# Patient Record
Sex: FEMALE | Race: WHITE | URBAN - METROPOLITAN AREA
[De-identification: names, ages, dates, MRNs, and addresses within clinical notes are randomized per-mention and may not be internally consistent; named-entity substitution may affect disease eponyms.]

---

## 2022-10-17 ENCOUNTER — TELEPHONE (OUTPATIENT)
Dept: FAMILY MEDICINE CLINIC | Facility: CLINIC | Age: 37
End: 2022-10-17

## 2022-10-17 ENCOUNTER — OFFICE VISIT (OUTPATIENT)
Dept: FAMILY MEDICINE CLINIC | Facility: CLINIC | Age: 37
End: 2022-10-17
Payer: COMMERCIAL

## 2022-10-17 VITALS
RESPIRATION RATE: 16 BRPM | BODY MASS INDEX: 23.98 KG/M2 | SYSTOLIC BLOOD PRESSURE: 106 MMHG | WEIGHT: 151 LBS | HEIGHT: 66.5 IN | DIASTOLIC BLOOD PRESSURE: 72 MMHG | HEART RATE: 68 BPM | OXYGEN SATURATION: 98 %

## 2022-10-17 DIAGNOSIS — Z11.1 SCREENING FOR TUBERCULOSIS: ICD-10-CM

## 2022-10-17 DIAGNOSIS — Z00.00 LABORATORY EXAMINATION ORDERED AS PART OF A ROUTINE GENERAL MEDICAL EXAMINATION: ICD-10-CM

## 2022-10-17 DIAGNOSIS — Z00.00 WELLNESS EXAMINATION: Primary | ICD-10-CM

## 2022-10-17 PROBLEM — N80.9 ENDOMETRIOSIS: Status: ACTIVE | Noted: 2022-10-17

## 2022-10-19 ENCOUNTER — NURSE ONLY (OUTPATIENT)
Dept: FAMILY MEDICINE CLINIC | Facility: CLINIC | Age: 37
End: 2022-10-19
Payer: COMMERCIAL

## 2022-10-19 LAB — INDURATION (): 0 MM (ref 0–11)

## 2022-10-19 NOTE — PROGRESS NOTES
Wellness form was completed during nurse visit / was faxed # [phone] to [facility] 202 per Pt request. Success confirmation was received. Copy sent to scanning.

## 2022-10-26 ENCOUNTER — MED REC SCAN ONLY (OUTPATIENT)
Dept: FAMILY MEDICINE CLINIC | Facility: CLINIC | Age: 37
End: 2022-10-26

## 2022-11-04 ENCOUNTER — LAB ENCOUNTER (OUTPATIENT)
Dept: LAB | Age: 37
End: 2022-11-04
Attending: FAMILY MEDICINE
Payer: COMMERCIAL

## 2022-11-04 DIAGNOSIS — Z00.00 LABORATORY EXAMINATION ORDERED AS PART OF A ROUTINE GENERAL MEDICAL EXAMINATION: ICD-10-CM

## 2022-11-04 LAB
ALBUMIN SERPL-MCNC: 4.4 G/DL (ref 3.4–5)
ALBUMIN/GLOB SERPL: 1.1 {RATIO} (ref 1–2)
ALP LIVER SERPL-CCNC: 63 U/L
ALT SERPL-CCNC: 21 U/L
ANION GAP SERPL CALC-SCNC: 8 MMOL/L (ref 0–18)
AST SERPL-CCNC: 13 U/L (ref 15–37)
BASOPHILS # BLD AUTO: 0.03 X10(3) UL (ref 0–0.2)
BASOPHILS NFR BLD AUTO: 0.6 %
BILIRUB SERPL-MCNC: 0.6 MG/DL (ref 0.1–2)
BUN BLD-MCNC: 11 MG/DL (ref 7–18)
CALCIUM BLD-MCNC: 9.2 MG/DL (ref 8.5–10.1)
CHLORIDE SERPL-SCNC: 108 MMOL/L (ref 98–112)
CHOLEST SERPL-MCNC: 141 MG/DL (ref ?–200)
CO2 SERPL-SCNC: 23 MMOL/L (ref 21–32)
CREAT BLD-MCNC: 0.79 MG/DL
EOSINOPHIL # BLD AUTO: 0.09 X10(3) UL (ref 0–0.7)
EOSINOPHIL NFR BLD AUTO: 1.9 %
ERYTHROCYTE [DISTWIDTH] IN BLOOD BY AUTOMATED COUNT: 12.6 %
FASTING PATIENT LIPID ANSWER: YES
FASTING STATUS PATIENT QL REPORTED: YES
GFR SERPLBLD BASED ON 1.73 SQ M-ARVRAT: 100 ML/MIN/1.73M2 (ref 60–?)
GLOBULIN PLAS-MCNC: 3.9 G/DL (ref 2.8–4.4)
GLUCOSE BLD-MCNC: 100 MG/DL (ref 70–99)
HCT VFR BLD AUTO: 39.9 %
HDLC SERPL-MCNC: 66 MG/DL (ref 40–59)
HGB BLD-MCNC: 13.6 G/DL
IMM GRANULOCYTES # BLD AUTO: 0.01 X10(3) UL (ref 0–1)
IMM GRANULOCYTES NFR BLD: 0.2 %
LDLC SERPL CALC-MCNC: 62 MG/DL (ref ?–100)
LYMPHOCYTES # BLD AUTO: 1.27 X10(3) UL (ref 1–4)
LYMPHOCYTES NFR BLD AUTO: 26.4 %
MCH RBC QN AUTO: 32.5 PG (ref 26–34)
MCHC RBC AUTO-ENTMCNC: 34.1 G/DL (ref 31–37)
MCV RBC AUTO: 95.5 FL
MONOCYTES # BLD AUTO: 0.27 X10(3) UL (ref 0.1–1)
MONOCYTES NFR BLD AUTO: 5.6 %
NEUTROPHILS # BLD AUTO: 3.14 X10 (3) UL (ref 1.5–7.7)
NEUTROPHILS # BLD AUTO: 3.14 X10(3) UL (ref 1.5–7.7)
NEUTROPHILS NFR BLD AUTO: 65.3 %
NONHDLC SERPL-MCNC: 75 MG/DL (ref ?–130)
OSMOLALITY SERPL CALC.SUM OF ELEC: 287 MOSM/KG (ref 275–295)
PLATELET # BLD AUTO: 251 10(3)UL (ref 150–450)
POTASSIUM SERPL-SCNC: 4.4 MMOL/L (ref 3.5–5.1)
PROT SERPL-MCNC: 8.3 G/DL (ref 6.4–8.2)
RBC # BLD AUTO: 4.18 X10(6)UL
SODIUM SERPL-SCNC: 139 MMOL/L (ref 136–145)
T4 FREE SERPL-MCNC: 0.8 NG/DL (ref 0.8–1.7)
TRIGL SERPL-MCNC: 63 MG/DL (ref 30–149)
TSI SER-ACNC: 4.13 MIU/ML (ref 0.36–3.74)
VLDLC SERPL CALC-MCNC: 9 MG/DL (ref 0–30)
WBC # BLD AUTO: 4.8 X10(3) UL (ref 4–11)

## 2022-11-04 PROCEDURE — 80061 LIPID PANEL: CPT | Performed by: FAMILY MEDICINE

## 2022-11-04 PROCEDURE — 84439 ASSAY OF FREE THYROXINE: CPT | Performed by: FAMILY MEDICINE

## 2022-11-04 PROCEDURE — 80050 GENERAL HEALTH PANEL: CPT | Performed by: FAMILY MEDICINE

## 2022-11-07 ENCOUNTER — TELEPHONE (OUTPATIENT)
Dept: FAMILY MEDICINE CLINIC | Facility: CLINIC | Age: 37
End: 2022-11-07

## 2022-11-07 DIAGNOSIS — R79.89 ELEVATED TSH: Primary | ICD-10-CM

## 2022-11-07 NOTE — TELEPHONE ENCOUNTER
----- Message from Santiago Guajardo MD sent at 11/7/2022 12:58 PM CST -----  Annual labs reviewed. FBG borderline, improve diet/exercise. Thyroid labs abnormal showing subclinical hypothyroidism pattern. No need for treatment right now but will repeat TSH in 6 wks to monitor.

## 2022-12-06 ENCOUNTER — OFFICE VISIT (OUTPATIENT)
Dept: FAMILY MEDICINE CLINIC | Facility: CLINIC | Age: 37
End: 2022-12-06
Payer: COMMERCIAL

## 2022-12-06 VITALS
DIASTOLIC BLOOD PRESSURE: 78 MMHG | HEIGHT: 67 IN | HEART RATE: 80 BPM | TEMPERATURE: 98 F | SYSTOLIC BLOOD PRESSURE: 112 MMHG | RESPIRATION RATE: 18 BRPM | BODY MASS INDEX: 23.54 KG/M2 | WEIGHT: 150 LBS | OXYGEN SATURATION: 98 %

## 2022-12-06 DIAGNOSIS — R68.89 FLU-LIKE SYMPTOMS: Primary | ICD-10-CM

## 2022-12-06 DIAGNOSIS — J02.9 SORE THROAT: ICD-10-CM

## 2022-12-06 LAB
CONTROL LINE PRESENT WITH A CLEAR BACKGROUND (YES/NO): YES YES/NO
KIT LOT #: NORMAL NUMERIC
STREP GRP A CUL-SCR: NEGATIVE

## 2022-12-06 PROCEDURE — 3008F BODY MASS INDEX DOCD: CPT | Performed by: NURSE PRACTITIONER

## 2022-12-06 PROCEDURE — 87880 STREP A ASSAY W/OPTIC: CPT | Performed by: NURSE PRACTITIONER

## 2022-12-06 PROCEDURE — 87637 SARSCOV2&INF A&B&RSV AMP PRB: CPT | Performed by: NURSE PRACTITIONER

## 2022-12-06 PROCEDURE — 3078F DIAST BP <80 MM HG: CPT | Performed by: NURSE PRACTITIONER

## 2022-12-06 PROCEDURE — 3074F SYST BP LT 130 MM HG: CPT | Performed by: NURSE PRACTITIONER

## 2022-12-06 PROCEDURE — 99213 OFFICE O/P EST LOW 20 MIN: CPT | Performed by: NURSE PRACTITIONER

## 2022-12-07 LAB
FLUAV + FLUBV RNA SPEC NAA+PROBE: NEGATIVE
FLUAV + FLUBV RNA SPEC NAA+PROBE: NEGATIVE
RSV RNA SPEC NAA+PROBE: NEGATIVE
SARS-COV-2 RNA RESP QL NAA+PROBE: NOT DETECTED

## 2024-01-03 ENCOUNTER — LAB ENCOUNTER (OUTPATIENT)
Dept: LAB | Age: 39
End: 2024-01-03
Attending: FAMILY MEDICINE
Payer: COMMERCIAL

## 2024-01-03 ENCOUNTER — OFFICE VISIT (OUTPATIENT)
Dept: FAMILY MEDICINE CLINIC | Facility: CLINIC | Age: 39
End: 2024-01-03
Payer: COMMERCIAL

## 2024-01-03 VITALS
RESPIRATION RATE: 16 BRPM | SYSTOLIC BLOOD PRESSURE: 106 MMHG | HEIGHT: 67 IN | WEIGHT: 152 LBS | HEART RATE: 71 BPM | OXYGEN SATURATION: 98 % | BODY MASS INDEX: 23.86 KG/M2 | DIASTOLIC BLOOD PRESSURE: 66 MMHG

## 2024-01-03 DIAGNOSIS — Z00.00 WELLNESS EXAMINATION: Primary | ICD-10-CM

## 2024-01-03 DIAGNOSIS — Z23 NEED FOR VACCINATION: ICD-10-CM

## 2024-01-03 DIAGNOSIS — Z00.00 LABORATORY EXAMINATION ORDERED AS PART OF A ROUTINE GENERAL MEDICAL EXAMINATION: ICD-10-CM

## 2024-01-03 DIAGNOSIS — N80.9 ENDOMETRIOSIS: ICD-10-CM

## 2024-01-03 DIAGNOSIS — M25.531 RIGHT WRIST PAIN: ICD-10-CM

## 2024-01-03 LAB
ALBUMIN SERPL-MCNC: 4.5 G/DL (ref 3.4–5)
ALBUMIN/GLOB SERPL: 1.3 {RATIO} (ref 1–2)
ALP LIVER SERPL-CCNC: 62 U/L
ALT SERPL-CCNC: 25 U/L
ANION GAP SERPL CALC-SCNC: 4 MMOL/L (ref 0–18)
AST SERPL-CCNC: 20 U/L (ref 15–37)
BASOPHILS # BLD AUTO: 0.02 X10(3) UL (ref 0–0.2)
BASOPHILS NFR BLD AUTO: 0.5 %
BILIRUB SERPL-MCNC: 0.8 MG/DL (ref 0.1–2)
BUN BLD-MCNC: 10 MG/DL (ref 9–23)
CALCIUM BLD-MCNC: 9.1 MG/DL (ref 8.5–10.1)
CHLORIDE SERPL-SCNC: 104 MMOL/L (ref 98–112)
CHOLEST SERPL-MCNC: 166 MG/DL (ref ?–200)
CO2 SERPL-SCNC: 27 MMOL/L (ref 21–32)
CREAT BLD-MCNC: 0.79 MG/DL
EGFRCR SERPLBLD CKD-EPI 2021: 99 ML/MIN/1.73M2 (ref 60–?)
EOSINOPHIL # BLD AUTO: 0.06 X10(3) UL (ref 0–0.7)
EOSINOPHIL NFR BLD AUTO: 1.5 %
ERYTHROCYTE [DISTWIDTH] IN BLOOD BY AUTOMATED COUNT: 12.5 %
FASTING PATIENT LIPID ANSWER: YES
FASTING STATUS PATIENT QL REPORTED: YES
GLOBULIN PLAS-MCNC: 3.5 G/DL (ref 2.8–4.4)
GLUCOSE BLD-MCNC: 102 MG/DL (ref 70–99)
HCT VFR BLD AUTO: 39 %
HDLC SERPL-MCNC: 66 MG/DL (ref 40–59)
HGB BLD-MCNC: 13.1 G/DL
IMM GRANULOCYTES # BLD AUTO: 0.02 X10(3) UL (ref 0–1)
IMM GRANULOCYTES NFR BLD: 0.5 %
LDLC SERPL CALC-MCNC: 82 MG/DL (ref ?–100)
LYMPHOCYTES # BLD AUTO: 1.08 X10(3) UL (ref 1–4)
LYMPHOCYTES NFR BLD AUTO: 26.4 %
MCH RBC QN AUTO: 31.4 PG (ref 26–34)
MCHC RBC AUTO-ENTMCNC: 33.6 G/DL (ref 31–37)
MCV RBC AUTO: 93.5 FL
MONOCYTES # BLD AUTO: 0.32 X10(3) UL (ref 0.1–1)
MONOCYTES NFR BLD AUTO: 7.8 %
NEUTROPHILS # BLD AUTO: 2.59 X10 (3) UL (ref 1.5–7.7)
NEUTROPHILS # BLD AUTO: 2.59 X10(3) UL (ref 1.5–7.7)
NEUTROPHILS NFR BLD AUTO: 63.3 %
NONHDLC SERPL-MCNC: 100 MG/DL (ref ?–130)
OSMOLALITY SERPL CALC.SUM OF ELEC: 279 MOSM/KG (ref 275–295)
PLATELET # BLD AUTO: 260 10(3)UL (ref 150–450)
POTASSIUM SERPL-SCNC: 4 MMOL/L (ref 3.5–5.1)
PROT SERPL-MCNC: 8 G/DL (ref 6.4–8.2)
RBC # BLD AUTO: 4.17 X10(6)UL
SODIUM SERPL-SCNC: 135 MMOL/L (ref 136–145)
TRIGL SERPL-MCNC: 98 MG/DL (ref 30–149)
TSI SER-ACNC: 2.97 MIU/ML (ref 0.36–3.74)
VLDLC SERPL CALC-MCNC: 15 MG/DL (ref 0–30)
WBC # BLD AUTO: 4.1 X10(3) UL (ref 4–11)

## 2024-01-03 PROCEDURE — 80053 COMPREHEN METABOLIC PANEL: CPT

## 2024-01-03 PROCEDURE — 3078F DIAST BP <80 MM HG: CPT | Performed by: FAMILY MEDICINE

## 2024-01-03 PROCEDURE — 90686 IIV4 VACC NO PRSV 0.5 ML IM: CPT | Performed by: FAMILY MEDICINE

## 2024-01-03 PROCEDURE — 80061 LIPID PANEL: CPT

## 2024-01-03 PROCEDURE — 3008F BODY MASS INDEX DOCD: CPT | Performed by: FAMILY MEDICINE

## 2024-01-03 PROCEDURE — 99395 PREV VISIT EST AGE 18-39: CPT | Performed by: FAMILY MEDICINE

## 2024-01-03 PROCEDURE — 3074F SYST BP LT 130 MM HG: CPT | Performed by: FAMILY MEDICINE

## 2024-01-03 PROCEDURE — 84443 ASSAY THYROID STIM HORMONE: CPT

## 2024-01-03 PROCEDURE — 85025 COMPLETE CBC W/AUTO DIFF WBC: CPT

## 2024-01-03 PROCEDURE — 90471 IMMUNIZATION ADMIN: CPT | Performed by: FAMILY MEDICINE

## 2024-01-03 NOTE — PROGRESS NOTES
HPI:     Magdalena Hernandez is a 38 year old female who presents for an Annual Health Visit.      R wrist pain - intermittent, comes and goes. Only occurs when she is pushing off from her R hand/wrist. No pain otherwise. No swelling, warmth/redness, numbness. No known injury. No sx currently.     Allergies:     Allergies   Allergen Reactions    Amoxicillin OTHER (SEE COMMENTS)     Do not remember reaction   - per Pt      Augmentin [Amoxicillin-Pot Clavulanate] UNKNOWN    Penicillins UNKNOWN       CURRENT MEDICATIONS:   No current outpatient medications on file.        MEDICAL INFORMATION:   Past Medical History:   Diagnosis Date    Mild preeclamp-del w/ postpartum complication 2021      No past surgical history on file.   Family History   Problem Relation Age of Onset    Bipolar Disorder Mother     Depression Mother     Anxiety Mother     Fibromyalgia Mother       SOCIAL HISTORY:   Social History     Socioeconomic History    Marital status:    Tobacco Use    Smoking status: Never    Smokeless tobacco: Never   Vaping Use    Vaping Use: Never used   Substance and Sexual Activity    Alcohol use: Yes     Alcohol/week: 2.0 standard drinks of alcohol     Types: 2 Glasses of wine per week     Comment: 3-4 times per week /     Drug use: Never     Social History     Social History Narrative    Not on file        REVIEW OF SYSTEMS:     Constitutional: negative  Eyes: negative  ENT: negative  Respiratory: negative  Cardiovascular: negative  Gastrointestinal: negative  Integument/Breast: negative  Genitourinary: negative  Heme/Lymph: negative  Musculoskeletal:  see HPI  Neurological: negative  Psych: negative  Endocrine: negative  Allergic/Immune: negative        EXAM:   /66   Pulse 71   Resp 16   Ht 5' 7\" (1.702 m)   Wt 152 lb (68.9 kg)   LMP 12/25/2023 (Exact Date)   SpO2 98%   BMI 23.81 kg/m²    Wt Readings from Last 6 Encounters:   01/03/24 152 lb (68.9 kg)   12/06/22 150 lb (68 kg)   10/17/22  151 lb (68.5 kg)     Body mass index is 23.81 kg/m².    General: alert, appears stated age, and cooperative  Head: Normocephalic, without obvious abnormality, atraumatic  Eyes: negative  Ears: normal TM's and external ear canals both ears  Nose: Nares normal. Septum midline. Mucosa normal. No drainage or sinus tenderness.  Throat: lips, mucosa, and tongue normal; teeth and gums normal  Neck: no adenopathy, supple, symmetrical, trachea midline, and thyroid not enlarged, symmetric, no tenderness/mass/nodules  Heart: S1, S2 normal, no murmur, click, rub or gallop, regular rate and rhythm  Lungs: clear to auscultation bilaterally  Breast:  deferred  Abdomen: soft, non-tender; bowel sounds normal; no masses,  no organomegaly  Pelvic: deferred  Back: negative  Extremities: extremities normal, atraumatic, no cyanosis or edema. R hand/wrist exam wnl, ROM wnl.  Pulses: 2+ and symmetric  Skin: Skin color, texture, turgor normal. No rashes or lesions  Lymph Nodes:  No LAD  Neurologic: Grossly normal      ASSESSMENT AND PLAN:   Magdalena was seen today for well adult and complete form.    Diagnoses and all orders for this visit:    Wellness examination  -Immunizations: Flu today   -Metabolic: BMI 23. BP wnl. Due for annual labs   -Cancer screening: pap upcoming.   -Communicable disease: low risk   -Mental health: no concerns   -Other preventative: follow with dentistry and optometry.   -Lifestyle: Follow a well balanced healthy diet with emphasis on fruits, vegetables, whole grains, lean meats. Limit processed and junk foods. Aim for at least 150 minutes of moderate intensity exercise weekly. Make sure you are staying adequately hydrated. Aim to get 7-9 hours of sleep nightly.     Laboratory examination ordered as part of a routine general medical examination  -     CBC With Differential With Platelet; Future  -     Comp Metabolic Panel (14); Future  -     Lipid Panel; Future  -     TSH W Reflex To Free T4;  Future    Endometriosis  Needs new referral for GYN.   -     OBG Referral - In Network    Right wrist pain  Possible tendinitis? Counseled on conservative mgmt including RICE therapy, NSAIDs/tylenol. F/u if no improving will obtain imaging.     Patient Instructions     Health Screening Guidelines, Women Ages 18 to 39   Screening tests and health counseling are a key part of managing your health. A screening test is done to find disorders or diseases in people who don't have any symptoms. Screening tests are not used to diagnose. They are used to find out if more testing is needed. The goal may be to find a disease early so it can be treated with more success. Or the goal may be to find a disease early so you can make lifestyle changes. You may need regular checkups to help you reduce your risk of disease.   Below are guidelines for women ages 18 to 39. Talk with your healthcare provider. Make sure you’re up-to-date on what you need.   We understand gender is a spectrum. We may use gendered terms to talk about anatomy and health risk. Please use this information in a way that works best for you and your provider as you talk about your care.   Screening  Who needs it  How often    Alcohol misuse All women in this age group  At routine exams   Blood pressure All women in this age group  Once a year if your blood pressure is normal. Normal blood pressure is less than 120/80 mm Hg. If your blood pressure is higher than this, follow the advice of your healthcare provider.    Breast cancer All women in this age group should talk with their healthcare provider about a clinical breast exam (CBE).1  Clinical breast exam every 3 years    Cervical cancer Women ages 21 and older  Women ages 21 to 29 should have a Pap test every 3 years. Women ages 30 to 65 should have a Pap test and an HPV test every 5 years.    Chlamydia Women who are sexually active. This includes those who are pregnant or who are:   Age 24 or younger  Age 25  or older at higher risk for infection     At routine yearly exams   If pregnant, during early prenatal care visit. Repeat in 3rd trimester for women at higher risk.    Depression All women in this age group  At routine exams   Diabetes mellitus, type 2  Women with no symptoms who are overweight or obese and have 1 or more other risk factors for diabetes  At least every 3 years. Testing in pregnancy after the 24th week.     Gonorrhea Women who are sexually active. This includes those who are pregnant or who are:   Age 24 or younger  Age 25 or older at higher risk for infection     At routine yearly exams   Test in pregnancy if age 25 or younger or if living in an area where gonorrhea is common    Hepatitis C Anyone at higher risk  At routine exams   HIV All women At routine exams and in all pregnant people    Obesity All women in this age group  At routine exams   Syphilis Women who are at higher risk for infection. Talk with your healthcare provider.  At routine exams   Tuberculosis Women who are at higher risk for infection. Talk with your healthcare provider.  Ask your healthcare provider    Vision All women in this age group  At least 1 full exam in your 20s, and 2 in your 30s    Health counseling  Who needs it  How often    BRCA gene mutation testing for breast and ovarian cancer risk  Women at higher risk for a gene mutation  When your risk is known    Breast cancer and chemoprevention  Women at high risk for breast cancer  When your risk is known    Diet and exercise Women who are overweight or obese  When diagnosed, and then at routine exams    Domestic violence All women in this age group  Every visit   Sexually transmitted infection (STI) prevention  Women who are sexually active  At routine exams   Skin cancer Women with pale skin  At routine exams   Use of tobacco and the health effects it can cause  All women in this age group  Every visit   The ACS advises all women ages 20 to 39 to have a clinical  breast exam (CBE) every 3 years. Breast self-exams are a choice for women age 20 and older. But the U.S. Preventive Services Task Force (USPSTF) does not advise CBE.   The USPSTF advises that people ages 15 to 65 years to be screened for HIV. They advise this test for younger or older people at higher risk. The CDC advises that all people ages 13 to 64 get tested for HIV at least 1 time.   [employer] last reviewed this educational content on 6/1/2021 © 2000-2023 [employer]. All rights reserved. This information is not intended as a substitute for professional medical care. Always follow your healthcare professional's instructions.          The patient indicates understanding of these issues and agrees to the plan.    Problem List:  Patient Active Problem List   Diagnosis    Endometriosis       Harris Reed MD  1/3/2024  8:13 AM

## 2024-01-03 NOTE — PATIENT INSTRUCTIONS
Health Screening Guidelines, Women Ages 18 to 39   Screening tests and health counseling are a key part of managing your health. A screening test is done to find disorders or diseases in people who don't have any symptoms. Screening tests are not used to diagnose. They are used to find out if more testing is needed. The goal may be to find a disease early so it can be treated with more success. Or the goal may be to find a disease early so you can make lifestyle changes. You may need regular checkups to help you reduce your risk of disease.   Below are guidelines for women ages 18 to 39. Talk with your healthcare provider. Make sure you’re up-to-date on what you need.   We understand gender is a spectrum. We may use gendered terms to talk about anatomy and health risk. Please use this information in a way that works best for you and your provider as you talk about your care.   Screening  Who needs it  How often    Alcohol misuse All women in this age group  At routine exams   Blood pressure All women in this age group  Once a year if your blood pressure is normal. Normal blood pressure is less than 120/80 mm Hg. If your blood pressure is higher than this, follow the advice of your healthcare provider.    Breast cancer All women in this age group should talk with their healthcare provider about a clinical breast exam (CBE).1  Clinical breast exam every 3 years    Cervical cancer Women ages 21 and older  Women ages 21 to 29 should have a Pap test every 3 years. Women ages 30 to 65 should have a Pap test and an HPV test every 5 years.    Chlamydia Women who are sexually active. This includes those who are pregnant or who are:   Age 24 or younger  Age 25 or older at higher risk for infection     At routine yearly exams   If pregnant, during early prenatal care visit. Repeat in 3rd trimester for women at higher risk.    Depression All women in this age group  At routine exams   Diabetes mellitus, type 2  Women with no  symptoms who are overweight or obese and have 1 or more other risk factors for diabetes  At least every 3 years. Testing in pregnancy after the 24th week.     Gonorrhea Women who are sexually active. This includes those who are pregnant or who are:   Age 24 or younger  Age 25 or older at higher risk for infection     At routine yearly exams   Test in pregnancy if age 25 or younger or if living in an area where gonorrhea is common    Hepatitis C Anyone at higher risk  At routine exams   HIV All women At routine exams and in all pregnant people    Obesity All women in this age group  At routine exams   Syphilis Women who are at higher risk for infection. Talk with your healthcare provider.  At routine exams   Tuberculosis Women who are at higher risk for infection. Talk with your healthcare provider.  Ask your healthcare provider    Vision All women in this age group  At least 1 full exam in your 20s, and 2 in your 30s    Health counseling  Who needs it  How often    BRCA gene mutation testing for breast and ovarian cancer risk  Women at higher risk for a gene mutation  When your risk is known    Breast cancer and chemoprevention  Women at high risk for breast cancer  When your risk is known    Diet and exercise Women who are overweight or obese  When diagnosed, and then at routine exams    Domestic violence All women in this age group  Every visit   Sexually transmitted infection (STI) prevention  Women who are sexually active  At routine exams   Skin cancer Women with pale skin  At routine exams   Use of tobacco and the health effects it can cause  All women in this age group  Every visit   The ACS advises all women ages 20 to 39 to have a clinical breast exam (CBE) every 3 years. Breast self-exams are a choice for women age 20 and older. But the U.S. Preventive Services Task Force (USPSTF) does not advise CBE.   The USPSTF advises that people ages 15 to 65 years to be screened for HIV. They advise this test for  younger or older people at higher risk. The CDC advises that all people ages 13 to 64 get tested for HIV at least 1 time.   [employer] last reviewed this educational content on 6/1/2021  © 4362-3978 [employer]. All rights reserved. This information is not intended as a substitute for professional medical care. Always follow your healthcare professional's instructions.

## 2024-01-05 ENCOUNTER — TELEPHONE (OUTPATIENT)
Dept: FAMILY MEDICINE CLINIC | Facility: CLINIC | Age: 39
End: 2024-01-05

## 2024-01-05 NOTE — TELEPHONE ENCOUNTER
Wellness form completed and faxed # [phone] to [facility].Success confirmation was received. Copy of form sent to scan.

## 2025-01-03 ENCOUNTER — OFFICE VISIT (OUTPATIENT)
Dept: FAMILY MEDICINE CLINIC | Facility: CLINIC | Age: 40
End: 2025-01-03
Payer: COMMERCIAL

## 2025-01-03 ENCOUNTER — LAB ENCOUNTER (OUTPATIENT)
Dept: LAB | Age: 40
End: 2025-01-03
Attending: FAMILY MEDICINE
Payer: COMMERCIAL

## 2025-01-03 VITALS
OXYGEN SATURATION: 98 % | RESPIRATION RATE: 16 BRPM | HEIGHT: 67 IN | HEART RATE: 75 BPM | DIASTOLIC BLOOD PRESSURE: 74 MMHG | BODY MASS INDEX: 24.01 KG/M2 | WEIGHT: 153 LBS | SYSTOLIC BLOOD PRESSURE: 116 MMHG

## 2025-01-03 DIAGNOSIS — Z00.00 WELLNESS EXAMINATION: Primary | ICD-10-CM

## 2025-01-03 DIAGNOSIS — Z00.00 LABORATORY EXAMINATION ORDERED AS PART OF A ROUTINE GENERAL MEDICAL EXAMINATION: ICD-10-CM

## 2025-01-03 LAB
ALBUMIN SERPL-MCNC: 5 G/DL (ref 3.2–4.8)
ALBUMIN/GLOB SERPL: 1.4 {RATIO} (ref 1–2)
ALP LIVER SERPL-CCNC: 62 U/L
ALT SERPL-CCNC: 26 U/L
ANION GAP SERPL CALC-SCNC: 11 MMOL/L (ref 0–18)
AST SERPL-CCNC: 23 U/L (ref ?–34)
BASOPHILS # BLD AUTO: 0.02 X10(3) UL (ref 0–0.2)
BASOPHILS NFR BLD AUTO: 0.4 %
BILIRUB SERPL-MCNC: 0.4 MG/DL (ref 0.3–1.2)
BUN BLD-MCNC: 12 MG/DL (ref 9–23)
CALCIUM BLD-MCNC: 9.8 MG/DL (ref 8.7–10.4)
CHLORIDE SERPL-SCNC: 103 MMOL/L (ref 98–112)
CHOLEST SERPL-MCNC: 183 MG/DL (ref ?–200)
CO2 SERPL-SCNC: 25 MMOL/L (ref 21–32)
CREAT BLD-MCNC: 0.76 MG/DL
EGFRCR SERPLBLD CKD-EPI 2021: 103 ML/MIN/1.73M2 (ref 60–?)
EOSINOPHIL # BLD AUTO: 0.09 X10(3) UL (ref 0–0.7)
EOSINOPHIL NFR BLD AUTO: 1.8 %
ERYTHROCYTE [DISTWIDTH] IN BLOOD BY AUTOMATED COUNT: 12.8 %
FASTING PATIENT LIPID ANSWER: YES
FASTING STATUS PATIENT QL REPORTED: YES
GLOBULIN PLAS-MCNC: 3.6 G/DL (ref 2–3.5)
GLUCOSE BLD-MCNC: 89 MG/DL (ref 70–99)
HCT VFR BLD AUTO: 40.1 %
HDLC SERPL-MCNC: 64 MG/DL (ref 40–59)
HGB BLD-MCNC: 13.4 G/DL
IMM GRANULOCYTES # BLD AUTO: 0.03 X10(3) UL (ref 0–1)
IMM GRANULOCYTES NFR BLD: 0.6 %
LDLC SERPL CALC-MCNC: 105 MG/DL (ref ?–100)
LYMPHOCYTES # BLD AUTO: 1.39 X10(3) UL (ref 1–4)
LYMPHOCYTES NFR BLD AUTO: 27.9 %
MCH RBC QN AUTO: 31.5 PG (ref 26–34)
MCHC RBC AUTO-ENTMCNC: 33.4 G/DL (ref 31–37)
MCV RBC AUTO: 94.4 FL
MONOCYTES # BLD AUTO: 0.37 X10(3) UL (ref 0.1–1)
MONOCYTES NFR BLD AUTO: 7.4 %
NEUTROPHILS # BLD AUTO: 3.08 X10 (3) UL (ref 1.5–7.7)
NEUTROPHILS # BLD AUTO: 3.08 X10(3) UL (ref 1.5–7.7)
NEUTROPHILS NFR BLD AUTO: 61.9 %
NONHDLC SERPL-MCNC: 119 MG/DL (ref ?–130)
OSMOLALITY SERPL CALC.SUM OF ELEC: 287 MOSM/KG (ref 275–295)
PLATELET # BLD AUTO: 277 10(3)UL (ref 150–450)
POTASSIUM SERPL-SCNC: 5 MMOL/L (ref 3.5–5.1)
PROT SERPL-MCNC: 8.6 G/DL (ref 5.7–8.2)
RBC # BLD AUTO: 4.25 X10(6)UL
SODIUM SERPL-SCNC: 139 MMOL/L (ref 136–145)
TRIGL SERPL-MCNC: 78 MG/DL (ref 30–149)
TSI SER-ACNC: 4.02 UIU/ML (ref 0.55–4.78)
VLDLC SERPL CALC-MCNC: 13 MG/DL (ref 0–30)
WBC # BLD AUTO: 5 X10(3) UL (ref 4–11)

## 2025-01-03 PROCEDURE — 80050 GENERAL HEALTH PANEL: CPT | Performed by: FAMILY MEDICINE

## 2025-01-03 PROCEDURE — 80061 LIPID PANEL: CPT | Performed by: FAMILY MEDICINE

## 2025-01-03 RX ORDER — GINKGO BILOBA LEAF EXTRACT 60 MG
CAPSULE ORAL AS DIRECTED
COMMUNITY

## 2025-01-03 NOTE — PATIENT INSTRUCTIONS
Health Screening Guidelines, Women Ages 18 to 39   Screening tests and health counseling are a key part of managing your health. A screening test is done to find disorders or diseases in people who don't have any symptoms. Screening tests are not used to diagnose. They are used to find out if more testing is needed. The goal may be to find a disease early so it can be treated with more success. Or the goal may be to find a disease early so you can make lifestyle changes. You may need regular checkups to help you reduce your risk of disease.   Below are guidelines for women ages 18 to 39. Talk with your healthcare provider. Make sure you’re up-to-date on what you need.   We understand gender is a spectrum. We may use gendered terms to talk about anatomy and health risk. Please use this information in a way that works best for you and your provider as you talk about your care.   Screening  Who needs it  How often    Alcohol misuse All women in this age group  At routine exams   Blood pressure All women in this age group  Once a year if your blood pressure is normal. Normal blood pressure is less than 120/80 mm Hg. If your blood pressure is higher than this, follow the advice of your healthcare provider.    Breast cancer All women in this age group should talk with their healthcare provider about a clinical breast exam (CBE).1  Clinical breast exam every 3 years    Cervical cancer Women ages 21 and older  Women ages 21 to 29 should have a Pap test every 3 years. Women ages 30 to 65 should have a Pap test and an HPV test every 5 years.    Chlamydia Women who are sexually active. This includes those who are pregnant or who are:   Age 24 or younger  Age 25 or older at higher risk for infection     At routine yearly exams   If pregnant, during early prenatal care visit. Repeat in 3rd trimester for women at higher risk.    Depression All women in this age group  At routine exams   Diabetes mellitus, type 2  Women with no  symptoms who are overweight or obese and have 1 or more other risk factors for diabetes  At least every 3 years. Testing in pregnancy after the 24th week.     Gonorrhea Women who are sexually active. This includes those who are pregnant or who are:   Age 24 or younger  Age 25 or older at higher risk for infection     At routine yearly exams   Test in pregnancy if age 25 or younger or if living in an area where gonorrhea is common    Hepatitis C Anyone at higher risk  At routine exams   HIV All women At routine exams and in all pregnant people    Obesity All women in this age group  At routine exams   Syphilis Women who are at higher risk for infection. Talk with your healthcare provider.  At routine exams   Tuberculosis Women who are at higher risk for infection. Talk with your healthcare provider.  Ask your healthcare provider    Vision All women in this age group  At least 1 full exam in your 20s, and 2 in your 30s    Health counseling  Who needs it  How often    BRCA gene mutation testing for breast and ovarian cancer risk  Women at higher risk for a gene mutation  When your risk is known    Breast cancer and chemoprevention  Women at high risk for breast cancer  When your risk is known    Diet and exercise Women who are overweight or obese  When diagnosed, and then at routine exams    Domestic violence All women in this age group  Every visit   Sexually transmitted infection (STI) prevention  Women who are sexually active  At routine exams   Skin cancer Women with pale skin  At routine exams   Use of tobacco and the health effects it can cause  All women in this age group  Every visit   The ACS advises all women ages 20 to 39 to have a clinical breast exam (CBE) every 3 years. Breast self-exams are a choice for women age 20 and older. But the U.S. Preventive Services Task Force (USPSTF) does not advise CBE.   The USPSTF advises that people ages 15 to 65 years to be screened for HIV. They advise this test for  younger or older people at higher risk. The CDC advises that all people ages 13 to 64 get tested for HIV at least 1 time.   [employer] last reviewed this educational content on 6/1/2021  © 8767-7127 [employer]. All rights reserved. This information is not intended as a substitute for professional medical care. Always follow your healthcare professional's instructions.

## 2025-01-03 NOTE — PROGRESS NOTES
HPI:     Magdalena Hernandez is a 39 year old female who presents for an Annual Health Visit.      No acute concerns.     Recent change in family history - father was recently diagnosed with throat cancer.     Allergies:     Allergies   Allergen Reactions    Amoxicillin OTHER (SEE COMMENTS)     Do not remember reaction   - per Pt      Augmentin [Amoxicillin-Pot Clavulanate] UNKNOWN    Penicillins UNKNOWN       CURRENT MEDICATIONS:   Current Outpatient Medications   Medication Sig Dispense Refill    Evening Primrose Oil 500 MG Oral Cap Take by mouth As Directed. (Patient not taking: Reported on 1/3/2025)      FOLIC ACID OR Take by mouth As Directed. (Patient not taking: Reported on 1/3/2025)        MEDICAL INFORMATION:   Past Medical History:    Mild preeclamp-del w/ postpartum complication (HCC)      History reviewed. No pertinent surgical history.   Family History   Problem Relation Age of Onset    Bipolar Disorder Mother     Depression Mother     Anxiety Mother     Fibromyalgia Mother       SOCIAL HISTORY:   Social History     Socioeconomic History    Marital status:    Tobacco Use    Smoking status: Never    Smokeless tobacco: Never   Vaping Use    Vaping status: Never Used   Substance and Sexual Activity    Alcohol use: Yes     Alcohol/week: 2.0 standard drinks of alcohol     Types: 2 Glasses of wine per week     Comment: 3-4 times per week /     Drug use: Never     Social History     Social History Narrative    Not on file        REVIEW OF SYSTEMS:     Constitutional: negative  Eyes: negative  ENT: negative  Respiratory: negative  Cardiovascular: negative  Gastrointestinal: negative  Integument/Breast: negative  Genitourinary: negative  Heme/Lymph: negative  Musculoskeletal: negative  Neurological: negative  Psych: negative  Endocrine: negative  Allergic/Immune: negative        EXAM:   /74   Pulse 75   Resp 16   Ht 5' 7\" (1.702 m)   Wt 153 lb (69.4 kg)   LMP 12/27/2024 (Exact Date)    SpO2 98%   BMI 23.96 kg/m²    Wt Readings from Last 6 Encounters:   01/03/25 153 lb (69.4 kg)   01/03/24 152 lb (68.9 kg)   12/06/22 150 lb (68 kg)   10/17/22 151 lb (68.5 kg)     Body mass index is 23.96 kg/m².    General: alert, appears stated age, and cooperative  Head: Normocephalic, without obvious abnormality, atraumatic  Eyes: negative  Ears: normal TM's and external ear canals both ears  Nose: Nares normal. Septum midline. Mucosa normal. No drainage or sinus tenderness.  Throat: lips, mucosa, and tongue normal; teeth and gums normal  Neck: no adenopathy, supple, symmetrical, trachea midline, and thyroid not enlarged, symmetric, no tenderness/mass/nodules  Heart: S1, S2 normal, no murmur, click, rub or gallop, regular rate and rhythm  Lungs: clear to auscultation bilaterally  Breast:  deferred  Abdomen: soft, non-tender; bowel sounds normal; no masses,  no organomegaly  Pelvic: deferred  Back: negative  Extremities: extremities normal, atraumatic, no cyanosis or edema  Pulses: 2+ and symmetric  Skin: Skin color, texture, turgor normal. No rashes or lesions  Lymph Nodes: No LAD  Neurologic: Grossly normal      ASSESSMENT AND PLAN:   Magdalena was seen today for annual.    Diagnoses and all orders for this visit:    Wellness examination    Laboratory examination ordered as part of a routine general medical examination  -     CBC With Differential With Platelet; Future  -     Comp Metabolic Panel (14); Future  -     Lipid Panel; Future  -     TSH W Reflex To Free T4; Future      -Immunizations: UTD - declines flu.    -Metabolic: BMI 23. BP wnl. Due for annual labs   -Cancer screening: due for pap - will f/u gyn  -Communicable disease: low risk   -Mental health: no concerns   -Other preventative: follow with dentistry and optometry.   -Lifestyle: Follow a well balanced healthy diet with emphasis on fruits, vegetables, whole grains, lean meats. Limit processed and junk foods. Aim for at least 150 minutes of moderate  intensity exercise weekly. Make sure you are staying adequately hydrated. Aim to get 7-9 hours of sleep nightly.       Patient Instructions     Health Screening Guidelines, Women Ages 18 to 39   Screening tests and health counseling are a key part of managing your health. A screening test is done to find disorders or diseases in people who don't have any symptoms. Screening tests are not used to diagnose. They are used to find out if more testing is needed. The goal may be to find a disease early so it can be treated with more success. Or the goal may be to find a disease early so you can make lifestyle changes. You may need regular checkups to help you reduce your risk of disease.   Below are guidelines for women ages 18 to 39. Talk with your healthcare provider. Make sure you’re up-to-date on what you need.   We understand gender is a spectrum. We may use gendered terms to talk about anatomy and health risk. Please use this information in a way that works best for you and your provider as you talk about your care.   Screening  Who needs it  How often    Alcohol misuse All women in this age group  At routine exams   Blood pressure All women in this age group  Once a year if your blood pressure is normal. Normal blood pressure is less than 120/80 mm Hg. If your blood pressure is higher than this, follow the advice of your healthcare provider.    Breast cancer All women in this age group should talk with their healthcare provider about a clinical breast exam (CBE).1  Clinical breast exam every 3 years    Cervical cancer Women ages 21 and older  Women ages 21 to 29 should have a Pap test every 3 years. Women ages 30 to 65 should have a Pap test and an HPV test every 5 years.    Chlamydia Women who are sexually active. This includes those who are pregnant or who are:   Age 24 or younger  Age 25 or older at higher risk for infection     At routine yearly exams   If pregnant, during early prenatal care visit. Repeat in  3rd trimester for women at higher risk.    Depression All women in this age group  At routine exams   Diabetes mellitus, type 2  Women with no symptoms who are overweight or obese and have 1 or more other risk factors for diabetes  At least every 3 years. Testing in pregnancy after the 24th week.     Gonorrhea Women who are sexually active. This includes those who are pregnant or who are:   Age 24 or younger  Age 25 or older at higher risk for infection     At routine yearly exams   Test in pregnancy if age 25 or younger or if living in an area where gonorrhea is common    Hepatitis C Anyone at higher risk  At routine exams   HIV All women At routine exams and in all pregnant people    Obesity All women in this age group  At routine exams   Syphilis Women who are at higher risk for infection. Talk with your healthcare provider.  At routine exams   Tuberculosis Women who are at higher risk for infection. Talk with your healthcare provider.  Ask your healthcare provider    Vision All women in this age group  At least 1 full exam in your 20s, and 2 in your 30s    Health counseling  Who needs it  How often    BRCA gene mutation testing for breast and ovarian cancer risk  Women at higher risk for a gene mutation  When your risk is known    Breast cancer and chemoprevention  Women at high risk for breast cancer  When your risk is known    Diet and exercise Women who are overweight or obese  When diagnosed, and then at routine exams    Domestic violence All women in this age group  Every visit   Sexually transmitted infection (STI) prevention  Women who are sexually active  At routine exams   Skin cancer Women with pale skin  At routine exams   Use of tobacco and the health effects it can cause  All women in this age group  Every visit   The ACS advises all women ages 20 to 39 to have a clinical breast exam (CBE) every 3 years. Breast self-exams are a choice for women age 20 and older. But the U.S. Preventive Services Task  Force (USPSTF) does not advise CBE.   The USPSTF advises that people ages 15 to 65 years to be screened for HIV. They advise this test for younger or older people at higher risk. The CDC advises that all people ages 13 to 64 get tested for HIV at least 1 time.   [employer] last reviewed this educational content on 6/1/2021  © 3296-5754 [employer]. All rights reserved. This information is not intended as a substitute for professional medical care. Always follow your healthcare professional's instructions.          The patient indicates understanding of these issues and agrees to the plan.    Problem List:  Patient Active Problem List   Diagnosis    Endometriosis       Harris Reed MD  1/3/2025  8:18 AM

## 2025-01-20 ENCOUNTER — OFFICE VISIT (OUTPATIENT)
Dept: OBGYN CLINIC | Facility: CLINIC | Age: 40
End: 2025-01-20
Payer: COMMERCIAL

## 2025-01-20 VITALS
WEIGHT: 156.63 LBS | BODY MASS INDEX: 24.58 KG/M2 | HEIGHT: 67 IN | HEART RATE: 74 BPM | SYSTOLIC BLOOD PRESSURE: 118 MMHG | DIASTOLIC BLOOD PRESSURE: 78 MMHG

## 2025-01-20 DIAGNOSIS — Z01.419 ENCOUNTER FOR GYNECOLOGICAL EXAMINATION WITHOUT ABNORMAL FINDING: Primary | ICD-10-CM

## 2025-01-20 DIAGNOSIS — N63.24 MASS OF LOWER INNER QUADRANT OF LEFT BREAST: ICD-10-CM

## 2025-01-20 DIAGNOSIS — N80.9 ENDOMETRIOSIS: ICD-10-CM

## 2025-01-20 DIAGNOSIS — Z12.4 SCREENING FOR CERVICAL CANCER: ICD-10-CM

## 2025-01-20 PROBLEM — Z87.59 HISTORY OF GESTATIONAL HYPERTENSION: Status: ACTIVE | Noted: 2025-01-20

## 2025-01-20 PROCEDURE — 88175 CYTOPATH C/V AUTO FLUID REDO: CPT | Performed by: OBSTETRICS & GYNECOLOGY

## 2025-01-20 PROCEDURE — 87624 HPV HI-RISK TYP POOLED RSLT: CPT | Performed by: OBSTETRICS & GYNECOLOGY

## 2025-01-20 RX ORDER — ELAGOLIX 150 MG/1
1 TABLET, FILM COATED ORAL DAILY
Qty: 90 TABLET | Refills: 1 | Status: SHIPPED | OUTPATIENT
Start: 2025-01-20 | End: 2025-02-19

## 2025-01-20 RX ORDER — ELAGOLIX 150 MG/1
1 TABLET, FILM COATED ORAL DAILY
Qty: 90 TABLET | Refills: 3 | Status: SHIPPED | OUTPATIENT
Start: 2025-01-20 | End: 2025-01-20

## 2025-01-20 NOTE — PROGRESS NOTES
Subjective:  Chief Complaint   Patient presents with    Annual     Annual Exam   Patient was referred by PCP to discuss endometriosis.   EXAM ROOM 4       39 year old female  presents for annual and also experiencing recurrent bad pelvic pain from endometriosis.  Monthly menses, normal duration and flow.  Bad pain just before and during menses.  No significant dyspareunia.  No relief previously with oral contraceptives and NSAIDS.  Had operative laparoscopy in  for fulguration of implants and endometrial polypectomy with mild temporary relief.  No GI or  symptoms.  Vasectomy for contraception.  Just moved here from [place].    Patient's last menstrual period was 2024 (exact date).  Hx Prior Abnormal Pap: No  Pap Date:  (3 years ago per pt)  Menarche: 14 (2025  2:21 PM)  Period Cycle (Days): 20-25 days (2025  2:21 PM)  Period Duration (Days): 3-5 days (2025  2:21 PM)  Period Flow: heavy (2025  2:21 PM)  Use of Birth Control (if yes, specify type): Vasectomy (2025  3:05 PM)  Hx Prior Abnormal Pap: No (2025  2:21 PM)  Pap Date:  (3 years ago per pt) (2025  2:21 PM)      Last Pap smear:      Abnormal Pap: n       Pelvic Infections/STD: None  Contraception: Vasectomy    Most Recent Immunizations   Administered Date(s) Administered    FLUZONE 6 months and older PFS 0.5 ml (71996) 2024    TDAP 2021    Tb Intradermal Test 10/17/2022      reports that she has never smoked. She has never used smokeless tobacco.   reports current alcohol use of about 2.0 standard drinks of alcohol per week.    Past Medical History:    Mild preeclamp-del w/ postpartum complication (HCC)     Past Surgical History:   Procedure Laterality Date    Laparoscopy,diagnostic  2020    with hysteroscopy polypectomy, fulgaration of endometriosis       Review of Systems:  Pertinent items are noted in the HPI.    Objective:  /78   Pulse 74   Ht 67\"   Wt 156 lb 9.6 oz (71 kg)    LMP 12/27/2024 (Exact Date)   BMI 24.53 kg/m²    Physical Examination:  General appearance: Well dressed, well nourished in no apparent distress  Neurologic/Psychiatric: Alert and oriented to person, place and time, mood normal, affect appropriate  Head: Normocephalic without obvious deformity, atraumatic  Neck: No thyromegaly, supple, non-tender, no masses, no adenopathy  Lungs: Clear to auscultation bilaterally, no rales, wheezes or rhonchi  Breasts: Symmetric, non-tender, no masses, lesions, retraction, dimpling or discharge bilaterally, no axillary or supraclavicular lymphadenopathy  Heart:: Regular rate and rhythm, no gallops or murmurs  Abdomen: Soft, non-tender, non-distended, no masses, no hepatosplenomegaly, no hernias, no inguinal lymphadenopathy  Pelvic:    External genitalia- Normal, Bartholin's, urethra, skeins glands normal   Vagina- No vaginal lesions, no discharge   Cervix- No lesions, long/closed, no cervical motion tenderness   Uterus- Normal sized, anteverted, non-tender, no masses   Adnexa-  Non-tender, no masses  Extremities: Non-tender, full range of motion, no clubbing, cyanosis or edema  Skin:  General inspection- no rashes, lesions or discoloration  Rectum: No hemorrhoids, no masses.    Assessment/Plan:  Normal well-woman exam.  Possible left breast mass- check Dx mammogram.  Pap/HPV obtained.  Endometriosis with recurrent pelvic pain, no improvement with COCP and NSAIDS.  Reviewed treatment options including expectant management, continuous OCP, Orlissa and hysterectomy.  Patient desires trial of Orlissa.  Check pelvic ultrasound.  Pamphlets on Orlissa and hysterectomy given.      Patient offered chaperone for exam, declined    Diagnoses and all orders for this visit:    Encounter for gynecological examination without abnormal finding    Endometriosis  -     Discontinue: Elagolix Sodium (ORILISSA) 150 MG Oral Tab; Take 1 tablet by mouth daily.  -     US PELVIS W EV (CPT=76856/51692);  Future  -     Elagolix Sodium (ORILISSA) 150 MG Oral Tab; Take 1 tablet by mouth daily.    Mass of lower inner quadrant of left breast  -     [facility] JAEL 2D+3D DIAGNOSTIC [facility]  BILAT (CPT=77066/05021); Future    Screening for cervical cancer  -     ThinPrep PAP Smear; Future  -     Hpv Dna  High Risk , Thin Prep Collect; Future       Return in about 6 months (around 7/20/2025) for Follow-up Visit.

## 2025-01-21 LAB — HPV E6+E7 MRNA CVX QL NAA+PROBE: NEGATIVE

## 2025-01-30 ENCOUNTER — TELEPHONE (OUTPATIENT)
Dept: OBGYN CLINIC | Facility: CLINIC | Age: 40
End: 2025-01-30

## 2025-01-30 NOTE — TELEPHONE ENCOUNTER
Prior authorization approved  Payer: [facility] - InformedRx Case ID: [id number]  Note from payer: Request Reference Number: [id number].  ORILISSA     TAB 150MG is approved through 07/30/2025.  Your patient may now fill this prescription and it will be covered.  Approval Details    Authorization number: [id number]  Authorized from January 30, 2025 to July 30, 2025

## 2025-02-11 ENCOUNTER — HOSPITAL ENCOUNTER (OUTPATIENT)
Dept: MAMMOGRAPHY | Facility: HOSPITAL | Age: 40
Discharge: HOME | End: 2025-02-11
Attending: OBSTETRICS & GYNECOLOGY
Payer: COMMERCIAL

## 2025-02-11 DIAGNOSIS — N63.24 MASS OF LOWER INNER QUADRANT OF LEFT BREAST: ICD-10-CM

## 2025-02-11 PROCEDURE — 76642 ULTRASOUND BREAST LIMITED: CPT | Performed by: OBSTETRICS & GYNECOLOGY

## 2025-02-11 PROCEDURE — 77066 DX MAMMO INCL CAD BI: CPT | Performed by: OBSTETRICS & GYNECOLOGY

## 2025-02-11 PROCEDURE — 77062 BREAST TOMOSYNTHESIS BI: CPT | Performed by: OBSTETRICS & GYNECOLOGY

## 2025-03-09 ENCOUNTER — HOSPITAL ENCOUNTER (OUTPATIENT)
Dept: ULTRASOUND IMAGING | Age: 40
End: 2025-03-09
Attending: OBSTETRICS & GYNECOLOGY
Payer: COMMERCIAL

## 2025-03-09 ENCOUNTER — HOSPITAL ENCOUNTER (OUTPATIENT)
Dept: ULTRASOUND IMAGING | Age: 40
Discharge: HOME | End: 2025-03-09
Attending: OBSTETRICS & GYNECOLOGY
Payer: COMMERCIAL

## 2025-03-09 DIAGNOSIS — N80.9 ENDOMETRIOSIS: ICD-10-CM

## 2025-03-09 PROCEDURE — 76830 TRANSVAGINAL US NON-OB: CPT | Performed by: OBSTETRICS & GYNECOLOGY

## 2025-03-09 PROCEDURE — 76856 US EXAM PELVIC COMPLETE: CPT | Performed by: OBSTETRICS & GYNECOLOGY
